# Patient Record
Sex: FEMALE | Race: WHITE | NOT HISPANIC OR LATINO | Employment: FULL TIME | URBAN - METROPOLITAN AREA
[De-identification: names, ages, dates, MRNs, and addresses within clinical notes are randomized per-mention and may not be internally consistent; named-entity substitution may affect disease eponyms.]

---

## 2025-07-13 ENCOUNTER — HOSPITAL ENCOUNTER (OUTPATIENT)
Facility: HOSPITAL | Age: 60
Setting detail: OBSERVATION
Discharge: LEFT BEFORE DISCHARGE | End: 2025-07-13
Attending: EMERGENCY MEDICINE | Admitting: INTERNAL MEDICINE
Payer: COMMERCIAL

## 2025-07-13 ENCOUNTER — APPOINTMENT (OUTPATIENT)
Dept: CARDIOLOGY | Facility: HOSPITAL | Age: 60
End: 2025-07-13
Payer: COMMERCIAL

## 2025-07-13 ENCOUNTER — APPOINTMENT (OUTPATIENT)
Dept: RADIOLOGY | Facility: HOSPITAL | Age: 60
End: 2025-07-13
Payer: COMMERCIAL

## 2025-07-13 VITALS
WEIGHT: 160 LBS | TEMPERATURE: 97.7 F | BODY MASS INDEX: 25.71 KG/M2 | RESPIRATION RATE: 14 BRPM | HEIGHT: 66 IN | SYSTOLIC BLOOD PRESSURE: 189 MMHG | HEART RATE: 59 BPM | DIASTOLIC BLOOD PRESSURE: 79 MMHG | OXYGEN SATURATION: 96 %

## 2025-07-13 DIAGNOSIS — R06.09 EXERTIONAL DYSPNEA: Primary | ICD-10-CM

## 2025-07-13 DIAGNOSIS — R07.9 CHEST PAIN, UNSPECIFIED TYPE: ICD-10-CM

## 2025-07-13 LAB
ALBUMIN SERPL BCP-MCNC: 4.2 G/DL (ref 3.4–5)
ALP SERPL-CCNC: 68 U/L (ref 33–136)
ALT SERPL W P-5'-P-CCNC: 10 U/L (ref 7–45)
ANION GAP SERPL CALC-SCNC: 10 MMOL/L (ref 10–20)
AST SERPL W P-5'-P-CCNC: 16 U/L (ref 9–39)
BASOPHILS # BLD AUTO: 0.08 X10*3/UL (ref 0–0.1)
BASOPHILS NFR BLD AUTO: 1 %
BILIRUB SERPL-MCNC: 0.4 MG/DL (ref 0–1.2)
BUN SERPL-MCNC: 16 MG/DL (ref 6–23)
CALCIUM SERPL-MCNC: 9.1 MG/DL (ref 8.6–10.3)
CARDIAC TROPONIN I PNL SERPL HS: 10 NG/L (ref 0–13)
CARDIAC TROPONIN I PNL SERPL HS: 6 NG/L (ref 0–13)
CHLORIDE SERPL-SCNC: 108 MMOL/L (ref 98–107)
CO2 SERPL-SCNC: 28 MMOL/L (ref 21–32)
CREAT SERPL-MCNC: 0.93 MG/DL (ref 0.5–1.05)
EGFRCR SERPLBLD CKD-EPI 2021: 70 ML/MIN/1.73M*2
EOSINOPHIL # BLD AUTO: 0.28 X10*3/UL (ref 0–0.7)
EOSINOPHIL NFR BLD AUTO: 3.6 %
ERYTHROCYTE [DISTWIDTH] IN BLOOD BY AUTOMATED COUNT: 14.2 % (ref 11.5–14.5)
GLUCOSE SERPL-MCNC: 155 MG/DL (ref 74–99)
HCT VFR BLD AUTO: 38 % (ref 36–46)
HGB BLD-MCNC: 12.5 G/DL (ref 12–16)
HOLD SPECIMEN: NORMAL
IMM GRANULOCYTES # BLD AUTO: 0.02 X10*3/UL (ref 0–0.7)
IMM GRANULOCYTES NFR BLD AUTO: 0.3 % (ref 0–0.9)
LYMPHOCYTES # BLD AUTO: 2.46 X10*3/UL (ref 1.2–4.8)
LYMPHOCYTES NFR BLD AUTO: 31.6 %
MAGNESIUM SERPL-MCNC: 2.28 MG/DL (ref 1.6–2.4)
MCH RBC QN AUTO: 29.3 PG (ref 26–34)
MCHC RBC AUTO-ENTMCNC: 32.9 G/DL (ref 32–36)
MCV RBC AUTO: 89 FL (ref 80–100)
MONOCYTES # BLD AUTO: 0.78 X10*3/UL (ref 0.1–1)
MONOCYTES NFR BLD AUTO: 10 %
NEUTROPHILS # BLD AUTO: 4.16 X10*3/UL (ref 1.2–7.7)
NEUTROPHILS NFR BLD AUTO: 53.5 %
NRBC BLD-RTO: 0 /100 WBCS (ref 0–0)
PLATELET # BLD AUTO: 270 X10*3/UL (ref 150–450)
POTASSIUM SERPL-SCNC: 3.6 MMOL/L (ref 3.5–5.3)
PROT SERPL-MCNC: 6.7 G/DL (ref 6.4–8.2)
RBC # BLD AUTO: 4.27 X10*6/UL (ref 4–5.2)
SODIUM SERPL-SCNC: 142 MMOL/L (ref 136–145)
WBC # BLD AUTO: 7.8 X10*3/UL (ref 4.4–11.3)

## 2025-07-13 PROCEDURE — 99285 EMERGENCY DEPT VISIT HI MDM: CPT | Mod: 25 | Performed by: EMERGENCY MEDICINE

## 2025-07-13 PROCEDURE — 84450 TRANSFERASE (AST) (SGOT): CPT | Performed by: PHYSICIAN ASSISTANT

## 2025-07-13 PROCEDURE — 84484 ASSAY OF TROPONIN QUANT: CPT | Performed by: PHYSICIAN ASSISTANT

## 2025-07-13 PROCEDURE — 2500000001 HC RX 250 WO HCPCS SELF ADMINISTERED DRUGS (ALT 637 FOR MEDICARE OP): Performed by: PHYSICIAN ASSISTANT

## 2025-07-13 PROCEDURE — 36415 COLL VENOUS BLD VENIPUNCTURE: CPT | Performed by: PHYSICIAN ASSISTANT

## 2025-07-13 PROCEDURE — G0378 HOSPITAL OBSERVATION PER HR: HCPCS

## 2025-07-13 PROCEDURE — 99223 1ST HOSP IP/OBS HIGH 75: CPT | Performed by: INTERNAL MEDICINE

## 2025-07-13 PROCEDURE — 83735 ASSAY OF MAGNESIUM: CPT | Performed by: PHYSICIAN ASSISTANT

## 2025-07-13 PROCEDURE — 71046 X-RAY EXAM CHEST 2 VIEWS: CPT | Performed by: STUDENT IN AN ORGANIZED HEALTH CARE EDUCATION/TRAINING PROGRAM

## 2025-07-13 PROCEDURE — 71046 X-RAY EXAM CHEST 2 VIEWS: CPT

## 2025-07-13 PROCEDURE — 85025 COMPLETE CBC W/AUTO DIFF WBC: CPT | Performed by: PHYSICIAN ASSISTANT

## 2025-07-13 PROCEDURE — 93005 ELECTROCARDIOGRAM TRACING: CPT

## 2025-07-13 RX ORDER — POLYETHYLENE GLYCOL 3350 17 G/17G
17 POWDER, FOR SOLUTION ORAL DAILY PRN
Status: DISCONTINUED | OUTPATIENT
Start: 2025-07-13 | End: 2025-07-13 | Stop reason: HOSPADM

## 2025-07-13 RX ORDER — ENOXAPARIN SODIUM 100 MG/ML
40 INJECTION SUBCUTANEOUS EVERY 24 HOURS
Status: DISCONTINUED | OUTPATIENT
Start: 2025-07-13 | End: 2025-07-13 | Stop reason: HOSPADM

## 2025-07-13 RX ORDER — TALC
3 POWDER (GRAM) TOPICAL NIGHTLY PRN
Status: DISCONTINUED | OUTPATIENT
Start: 2025-07-13 | End: 2025-07-13 | Stop reason: HOSPADM

## 2025-07-13 RX ORDER — ACETAMINOPHEN 325 MG/1
650 TABLET ORAL EVERY 4 HOURS PRN
Status: DISCONTINUED | OUTPATIENT
Start: 2025-07-13 | End: 2025-07-13 | Stop reason: HOSPADM

## 2025-07-13 RX ORDER — ASPIRIN 325 MG
325 TABLET ORAL ONCE
Status: COMPLETED | OUTPATIENT
Start: 2025-07-13 | End: 2025-07-13

## 2025-07-13 RX ORDER — ACETAMINOPHEN 650 MG/1
650 SUPPOSITORY RECTAL EVERY 4 HOURS PRN
Status: DISCONTINUED | OUTPATIENT
Start: 2025-07-13 | End: 2025-07-13 | Stop reason: HOSPADM

## 2025-07-13 RX ORDER — ACETAMINOPHEN 160 MG/5ML
650 SOLUTION ORAL EVERY 4 HOURS PRN
Status: DISCONTINUED | OUTPATIENT
Start: 2025-07-13 | End: 2025-07-13 | Stop reason: HOSPADM

## 2025-07-13 RX ADMIN — ASPIRIN 325 MG ORAL TABLET 325 MG: 325 PILL ORAL at 02:58

## 2025-07-13 ASSESSMENT — ENCOUNTER SYMPTOMS
DIZZINESS: 0
RHINORRHEA: 0
NAUSEA: 0
CONFUSION: 0
WOUND: 0
SHORTNESS OF BREATH: 0
DYSURIA: 0
HEMATURIA: 0
CHILLS: 0
COUGH: 0
DIARRHEA: 0
CONSTIPATION: 0
PALPITATIONS: 0
ARTHRALGIAS: 0
VOMITING: 0
MYALGIAS: 0
FEVER: 0
HALLUCINATIONS: 0
SORE THROAT: 0

## 2025-07-13 ASSESSMENT — HEART SCORE
TROPONIN: LESS THAN OR EQUAL TO NORMAL LIMIT
HEART SCORE: 3
HISTORY: MODERATELY SUSPICIOUS
AGE: 45-64
ECG: NORMAL
RISK FACTORS: 1-2 RISK FACTORS

## 2025-07-13 ASSESSMENT — PAIN SCALES - GENERAL: PAINLEVEL_OUTOF10: 0 - NO PAIN

## 2025-07-13 NOTE — ED TRIAGE NOTES
Pt presents to department with L shoulder pain and L sided chest pressure.. Pt states this has been going on for the past few days. Pt also endorses numbness/tingling to R arm for the past few months. Pt denies and SOB at this time. VSS. Respirations are even and unlabored.

## 2025-07-13 NOTE — DISCHARGE SUMMARY
"                                                                 Hospital Medicine Discharge Summary       Patient Name: Lizeth Acosta   YOB: 1965    Discharge Diagnosis: Exertional dyspnea   Discharge Date: 07/13/25  Discharge Location: [facility] from ED    Hospital Course:    Notified by nursing that patient left AMA from the ED. I did not have a chance to see the patient.         Physical Exam:     BP (!) 189/79   Pulse 59   Temp 36.5 °C (97.7 °F) (Temporal)   Resp 14   Ht 1.676 m (5' 6\")   Wt 72.6 kg (160 lb)   SpO2 96%   BMI 25.82 kg/m²           Discharge Medications:     Your medication list      as of July 13, 2025 11:01 AM     You have not been prescribed any medications.               Raphael Nguyen, DO  Hospital Medicine    "

## 2025-07-13 NOTE — H&P
History Of Present Illness  Lizeth Acosta is a 60 y.o. female former smoker, chronic hep B presented to ED with chest pains with exertion for one month.  Left sided CP when going up stairs, improved with rest.  Tonight it did not improve with rest so she presented to ED. Patient also states she sleeps on her right shoulder and gets separate pains in right shoulder.  Chest pains is not reproducible with palpation.      Past Medical History  Medical History[1]    Surgical History  Surgical History[2]     Social History  She has no history on file for tobacco use, alcohol use, and drug use.    Family History  Family History[3]     Allergies  Moxifloxacin    Review of Systems   Constitutional:  Negative for chills and fever.   HENT:  Negative for rhinorrhea and sore throat.    Respiratory:  Negative for cough and shortness of breath.    Cardiovascular:  Positive for chest pain. Negative for palpitations.   Gastrointestinal:  Negative for constipation, diarrhea, nausea and vomiting.   Genitourinary:  Negative for dysuria and hematuria.   Musculoskeletal:  Negative for arthralgias and myalgias.   Skin:  Negative for rash and wound.   Neurological:  Negative for dizziness and syncope.   Psychiatric/Behavioral:  Negative for confusion and hallucinations.         Physical Exam  Vitals reviewed.   Constitutional:       Appearance: Normal appearance.   HENT:      Head: Normocephalic and atraumatic.      Mouth/Throat:      Mouth: Mucous membranes are moist.   Eyes:      Conjunctiva/sclera: Conjunctivae normal.   Cardiovascular:      Rate and Rhythm: Normal rate.      Pulses: Normal pulses.   Pulmonary:      Effort: Pulmonary effort is normal.      Breath sounds: Normal breath sounds. No wheezing.   Abdominal:      General: Abdomen is flat. There is no distension.      Palpations: Abdomen is soft.      Tenderness: There is no guarding.   Musculoskeletal:         General: No swelling. Normal range of motion.   Skin:     General:  "Skin is warm and dry.   Neurological:      General: No focal deficit present.      Mental Status: She is alert. Mental status is at baseline.   Psychiatric:         Mood and Affect: Mood normal.         Behavior: Behavior normal.          Last Recorded Vitals  Blood pressure 158/75, pulse 64, temperature 36.5 °C (97.7 °F), temperature source Temporal, resp. rate 17, height 1.676 m (5' 6\"), weight 72.6 kg (160 lb), SpO2 97%.         Assessment & Plan  Exertional dyspnea      Chest pain rule out unstable angina vs MSK pains    Delta trop negative in ED, but with exertional symptoms and high heart score ED requested admission.  Consult cardiology, check echo, further testing per cardiology recs.  Home meds as appropriate      Miguelangel Medeiros MD         [1] History reviewed. No pertinent past medical history.  [2] History reviewed. No pertinent surgical history.  [3] No family history on file.    "

## 2025-07-13 NOTE — ED PROVIDER NOTES
"Limitations to History: none  External Records Reviewed  Independent Historians: self  Social determinants affecting care: none    HPI  Lizeth Acosta is a 60 y.o. female who presents to the emergency department for assessment of chest pressure.  She reports that this has been an issue for the last month or so.  She reports the left side of her chest she will feel pressure when she exerts herself specifically going up and down stairs.  She reports associated shortness of breath.  She reports that when she rests, the chest pressure and shortness of breath resolves.  Tonight it did not resolve with rest which made her concerned and she came to the ER.  She denies any associated sweats, nausea, or vomiting.  She does smoke cigarettes daily.  She reports that her mother  of an MI in her 60s.  She had a stress test several years ago as well as an echocardiogram which showed she had an issue with one of her valves.  She has not seen cardiology in some time.  She reports that also for the last 6 to 8-month she has been having numbness and tingling to the right arm which she believes is from sleeping on her arm wrong.  She denies any neck pain.  Denies fever or chills.  Denies cough or congestion.  Denies any weakness to the arm.  She has no further complaints.    [facility]  Medical History[1] reviewed by myself.    Meds  No current outpatient medications    Allergies  RX Allergies[2] reviewed by myself.    SHx  Social History[3] reviewed by myself.      ------------------------------------------------------------------------------------------------------------------------------------------    /59   Pulse 77   Temp 36.5 °C (97.7 °F) (Temporal)   Resp 14   Ht 1.676 m (5' 6\")   Wt 72.6 kg (160 lb)   SpO2 97%   BMI 25.82 kg/m²     Physical Exam  Vitals and nursing note reviewed.   Constitutional:       General: She is not in acute distress.     Appearance: Normal appearance. She is normal weight. She is not " ill-appearing or toxic-appearing.   HENT:      Head: Normocephalic.      Nose: Nose normal.      Mouth/Throat:      Mouth: Mucous membranes are moist.   Eyes:      Extraocular Movements: Extraocular movements intact.      Conjunctiva/sclera: Conjunctivae normal.   Cardiovascular:      Rate and Rhythm: Normal rate and regular rhythm.      Pulses:           Radial pulses are 2+ on the right side and 2+ on the left side.   Pulmonary:      Effort: Pulmonary effort is normal.      Breath sounds: Normal breath sounds.   Abdominal:      General: Abdomen is flat.      Palpations: Abdomen is soft.      Tenderness: There is no abdominal tenderness.   Musculoskeletal:         General: Normal range of motion.      Cervical back: Neck supple.      Right lower leg: No edema.      Left lower leg: No edema.   Skin:     General: Skin is warm and dry.   Neurological:      General: No focal deficit present.      Mental Status: She is alert and oriented to person, place, and time.   Psychiatric:         Attention and Perception: Attention normal.         Mood and Affect: Mood normal.          ------------------------------------------------------------------------------------------------------------------------------------------  Labs  Labs Reviewed   COMPREHENSIVE METABOLIC PANEL - Abnormal       Result Value    Glucose 155 (*)     Sodium 142      Potassium 3.6      Chloride 108 (*)     Bicarbonate 28      Anion Gap 10      Urea Nitrogen 16      Creatinine 0.93      eGFR 70      Calcium 9.1      Albumin 4.2      Alkaline Phosphatase 68      Total Protein 6.7      AST 16      Bilirubin, Total 0.4      ALT 10     MAGNESIUM - Normal    Magnesium 2.28     SERIAL TROPONIN-INITIAL - Normal    Troponin I, High Sensitivity 6      Narrative:     Less than 99th percentile of normal range cutoff-  Female and children under 18 years old <14 ng/L; Male <21 ng/L: Negative  Repeat testing should be performed if clinically indicated.     Female and  children under 18 years old 14-50 ng/L; Male 21-50 ng/L:  Consistent with possible cardiac damage and possible increased clinical   risk. Serial measurements may help to assess extent of myocardial damage.     >50 ng/L: Consistent with cardiac damage, increased clinical risk and  myocardial infarction. Serial measurements may help assess extent of   myocardial damage.      NOTE: Children less than 1 year old may have higher baseline troponin   levels and results should be interpreted in conjunction with the overall   clinical context.     NOTE: Troponin I testing is performed using a different   testing methodology at [facility] than at other   [facility]. Direct result comparisons should only   be made within the same method.   SERIAL TROPONIN, 1 HOUR - Normal    Troponin I, High Sensitivity 10      Narrative:     Less than 99th percentile of normal range cutoff-  Female and children under 18 years old <14 ng/L; Male <21 ng/L: Negative  Repeat testing should be performed if clinically indicated.     Female and children under 18 years old 14-50 ng/L; Male 21-50 ng/L:  Consistent with possible cardiac damage and possible increased clinical   risk. Serial measurements may help to assess extent of myocardial damage.     >50 ng/L: Consistent with cardiac damage, increased clinical risk and  myocardial infarction. Serial measurements may help assess extent of   myocardial damage.      NOTE: Children less than 1 year old may have higher baseline troponin   levels and results should be interpreted in conjunction with the overall   clinical context.     NOTE: Troponin I testing is performed using a different   testing methodology at [facility] than at other   [facility]. Direct result comparisons should only   be made within the same method.   CBC WITH AUTO DIFFERENTIAL    WBC 7.8      nRBC 0.0      RBC 4.27      Hemoglobin 12.5      Hematocrit 38.0      MCV 89      MCH 29.3      MCHC  32.9      RDW 14.2      Platelets 270      Neutrophils % 53.5      Immature Granulocytes %, Automated 0.3      Lymphocytes % 31.6      Monocytes % 10.0      Eosinophils % 3.6      Basophils % 1.0      Neutrophils Absolute 4.16      Immature Granulocytes Absolute, Automated 0.02      Lymphocytes Absolute 2.46      Monocytes Absolute 0.78      Eosinophils Absolute 0.28      Basophils Absolute 0.08     TROPONIN SERIES- (INITIAL, 1 HR)    Narrative:     The following orders were created for panel order Troponin I Series, High Sensitivity (0, 1 HR).  Procedure                               Abnormality         Status                     ---------                               -----------         ------                     Troponin I, High Sensiti...[[id number]]  Normal              Final result               Troponin, High Sensitivi...[[id number]]  Normal              Final result                 Please view results for these tests on the individual orders.        Imaging  XR chest 2 views   Final Result   No acute cardiopulmonary process.             MACRO:   None.        Signed by: Jalen Pugh 7/13/2025 1:58 AM   Dictation workstation:   [id number]           ED Course  ED Course as of 07/13/25 0349   Sun Jul 13, 2025   0014 EKG revealed normal sinus rhythm, rate is 94 bpm, normal WI, QRS, normal QTc duration.  No ST segment or T wave pathology.  Good R wave progression throughout the precordial leads [ME]      ED Course User Index  [ME] Yusef Jean,         Medical Decision Making: She did not appear ill or toxic.  Vital signs reviewed and stable.  She was placed in a continuous cardiac and pulse ox monitor.  She was offered analgesics and declined.  Cardiac workup pursued.    Differential diagnoses considered: NSTEMI, angina, pneumonia, pericarditis, others    Medications given: Oral aspirin    EKG interpreted by myself and ED attending: Normal sinus rhythm.  Ventricular rate 94 bpm.  No acute ST elevations  or depressions    I reviewed the labs from today.  No leukocytosis or leukopenia.  H&H is stable.  Glucose 155.  BUN and creatinine normal.  Magnesium normal.  Troponin negative.  Chest x-ray showing no acute cardiopulmonary process.  Heart score is a 3.  Case discussed and evaluated with ED attending.  See his attestation for final patient disposition.       [1] No past medical history on file.  [2]   Allergies  Allergen Reactions    Moxifloxacin Swelling, Diarrhea, GI Upset and Unknown   [3]         Sukumar Pace PA-C  07/13/25 0349

## 2025-07-14 LAB
ATRIAL RATE: 94 BPM
HOLD SPECIMEN: NORMAL
P AXIS: 42 DEGREES
P OFFSET: 192 MS
P ONSET: 149 MS
PR INTERVAL: 136 MS
Q ONSET: 217 MS
QRS COUNT: 15 BEATS
QRS DURATION: 80 MS
QT INTERVAL: 368 MS
QTC CALCULATION(BAZETT): 460 MS
QTC FREDERICIA: 427 MS
R AXIS: 15 DEGREES
T AXIS: 12 DEGREES
T OFFSET: 401 MS
VENTRICULAR RATE: 94 BPM

## 2025-08-07 LAB
ATRIAL RATE: 94 BPM
P AXIS: 42 DEGREES
P OFFSET: 192 MS
P ONSET: 149 MS
PR INTERVAL: 136 MS
Q ONSET: 217 MS
QRS COUNT: 15 BEATS
QRS DURATION: 80 MS
QT INTERVAL: 368 MS
QTC CALCULATION(BAZETT): 460 MS
QTC FREDERICIA: 427 MS
R AXIS: 15 DEGREES
T AXIS: 12 DEGREES
T OFFSET: 401 MS
VENTRICULAR RATE: 94 BPM